# Patient Record
Sex: MALE | Race: WHITE | NOT HISPANIC OR LATINO | Employment: OTHER | URBAN - METROPOLITAN AREA
[De-identification: names, ages, dates, MRNs, and addresses within clinical notes are randomized per-mention and may not be internally consistent; named-entity substitution may affect disease eponyms.]

---

## 2023-09-08 PROBLEM — K22.70 BARRETT'S ESOPHAGUS WITHOUT DYSPLASIA: Status: ACTIVE | Noted: 2023-09-08

## 2023-09-08 PROBLEM — I82.90 VENOUS THROMBOSIS: Status: ACTIVE | Noted: 2023-09-08

## 2023-09-08 PROBLEM — M12.9 ARTHROPATHY: Status: ACTIVE | Noted: 2023-09-08

## 2023-09-08 PROBLEM — C67.9 BLADDER CANCER (MULTI): Status: ACTIVE | Noted: 2023-09-08

## 2023-09-08 PROBLEM — M06.9 CHRONIC RHEUMATIC ARTHRITIS (MULTI): Status: ACTIVE | Noted: 2023-09-08

## 2023-09-08 PROBLEM — E78.6 FAMILIAL HDL DEFICIENCY: Status: ACTIVE | Noted: 2023-09-08

## 2023-09-08 PROBLEM — H04.129 TEAR FILM INSUFFICIENCY: Status: ACTIVE | Noted: 2023-09-08

## 2023-09-08 RX ORDER — LEVOTHYROXINE SODIUM 25 UG/1
1 TABLET ORAL
COMMUNITY

## 2023-09-08 RX ORDER — OMEPRAZOLE 40 MG/1
1 CAPSULE, DELAYED RELEASE ORAL DAILY
COMMUNITY
Start: 2019-05-30

## 2023-09-08 RX ORDER — ACETAMINOPHEN 160 MG/5ML
SUSPENSION ORAL
COMMUNITY

## 2023-09-08 RX ORDER — FOLIC ACID 1 MG/1
1 TABLET ORAL DAILY
COMMUNITY

## 2023-09-08 RX ORDER — OXYCODONE HCL 5 MG/5 ML
5 SOLUTION, ORAL ORAL EVERY 4 HOURS PRN
COMMUNITY

## 2023-09-08 RX ORDER — CHOLECALCIFEROL (VITAMIN D3) 50 MCG
TABLET ORAL
COMMUNITY
Start: 2010-04-15

## 2023-09-08 RX ORDER — TRAMADOL HYDROCHLORIDE 50 MG/1
1 TABLET ORAL EVERY 8 HOURS PRN
COMMUNITY

## 2023-09-08 RX ORDER — LEFLUNOMIDE 20 MG/1
1 TABLET ORAL DAILY
COMMUNITY

## 2023-09-08 RX ORDER — TOCILIZUMAB 20 MG/ML
INJECTION, SOLUTION, CONCENTRATE INTRAVENOUS
COMMUNITY

## 2023-09-08 RX ORDER — NAPROXEN SODIUM 220 MG/1
1 TABLET, FILM COATED ORAL DAILY
COMMUNITY

## 2023-09-08 RX ORDER — HYDROGEN PEROXIDE 3 %
1 SOLUTION, NON-ORAL MISCELLANEOUS DAILY
COMMUNITY

## 2023-09-08 RX ORDER — HYDROXYCHLOROQUINE SULFATE 200 MG/1
2 TABLET, FILM COATED ORAL DAILY
COMMUNITY

## 2023-09-08 RX ORDER — NAPROXEN SODIUM 220 MG/1
1 TABLET ORAL 3 TIMES DAILY
COMMUNITY

## 2023-09-08 RX ORDER — NAPROXEN 500 MG/1
1 TABLET ORAL EVERY 12 HOURS PRN
COMMUNITY

## 2024-02-09 ENCOUNTER — APPOINTMENT (OUTPATIENT)
Dept: OPHTHALMOLOGY | Facility: CLINIC | Age: 64
End: 2024-02-09

## 2024-03-04 ENCOUNTER — OFFICE VISIT (OUTPATIENT)
Dept: OPHTHALMOLOGY | Facility: CLINIC | Age: 64
End: 2024-03-04
Payer: MEDICARE

## 2024-03-04 ENCOUNTER — CLINICAL SUPPORT (OUTPATIENT)
Dept: OPHTHALMOLOGY | Facility: CLINIC | Age: 64
End: 2024-03-04
Payer: MEDICARE

## 2024-03-04 ENCOUNTER — APPOINTMENT (OUTPATIENT)
Dept: OPHTHALMOLOGY | Facility: CLINIC | Age: 64
End: 2024-03-04
Payer: MEDICARE

## 2024-03-04 DIAGNOSIS — H04.123 INSUFFICIENCY OF TEAR FILM OF BOTH EYES: ICD-10-CM

## 2024-03-04 DIAGNOSIS — H52.7 REFRACTION ERROR: ICD-10-CM

## 2024-03-04 DIAGNOSIS — H25.813 COMBINED FORMS OF AGE-RELATED CATARACT OF BOTH EYES: ICD-10-CM

## 2024-03-04 DIAGNOSIS — Z79.899 OTHER LONG TERM (CURRENT) DRUG THERAPY: Primary | ICD-10-CM

## 2024-03-04 PROBLEM — I82.90 VENOUS THROMBOSIS: Status: RESOLVED | Noted: 2023-09-08 | Resolved: 2024-03-04

## 2024-03-04 PROCEDURE — 92015 DETERMINE REFRACTIVE STATE: CPT | Performed by: OPHTHALMOLOGY

## 2024-03-04 PROCEDURE — 1036F TOBACCO NON-USER: CPT | Performed by: OPHTHALMOLOGY

## 2024-03-04 PROCEDURE — 92083 EXTENDED VISUAL FIELD XM: CPT | Performed by: OPHTHALMOLOGY

## 2024-03-04 PROCEDURE — 99214 OFFICE O/P EST MOD 30 MIN: CPT | Performed by: OPHTHALMOLOGY

## 2024-03-04 PROCEDURE — 1160F RVW MEDS BY RX/DR IN RCRD: CPT | Performed by: OPHTHALMOLOGY

## 2024-03-04 PROCEDURE — 1159F MED LIST DOCD IN RCRD: CPT | Performed by: OPHTHALMOLOGY

## 2024-03-04 PROCEDURE — 1126F AMNT PAIN NOTED NONE PRSNT: CPT | Performed by: OPHTHALMOLOGY

## 2024-03-04 RX ORDER — CYCLOBENZAPRINE HCL 10 MG
TABLET ORAL
COMMUNITY
Start: 2023-05-16

## 2024-03-04 RX ORDER — LOSARTAN POTASSIUM 50 MG/1
1 TABLET ORAL DAILY
COMMUNITY
Start: 2023-06-16

## 2024-03-04 RX ORDER — SILDENAFIL 50 MG/1
TABLET, FILM COATED ORAL
COMMUNITY
Start: 2024-02-08

## 2024-03-04 RX ORDER — ATORVASTATIN CALCIUM 40 MG/1
1 TABLET, FILM COATED ORAL DAILY
COMMUNITY
Start: 2023-03-08

## 2024-03-04 RX ORDER — GABAPENTIN 300 MG/1
CAPSULE ORAL
COMMUNITY
Start: 2023-09-20

## 2024-03-04 RX ORDER — PANTOPRAZOLE SODIUM 40 MG/1
TABLET, DELAYED RELEASE ORAL
COMMUNITY
Start: 2023-06-08

## 2024-03-04 RX ORDER — OXYCODONE AND ACETAMINOPHEN 5; 325 MG/1; MG/1
TABLET ORAL
COMMUNITY
Start: 2023-09-11

## 2024-03-04 RX ORDER — MULTIVITAMIN
1 TABLET ORAL 2 TIMES DAILY
COMMUNITY
Start: 2012-09-20

## 2024-03-04 RX ORDER — TAMSULOSIN HYDROCHLORIDE 0.4 MG/1
CAPSULE ORAL
COMMUNITY
Start: 2021-05-13

## 2024-03-04 RX ORDER — ISOSORBIDE MONONITRATE 30 MG/1
TABLET, EXTENDED RELEASE ORAL
COMMUNITY
Start: 2023-09-13

## 2024-03-04 RX ORDER — MULTIVITAMIN
1 TABLET ORAL
COMMUNITY
Start: 2011-06-03

## 2024-03-04 RX ORDER — LIDOCAINE 50 MG/G
1 PATCH TOPICAL
COMMUNITY
Start: 2023-11-28

## 2024-03-04 ASSESSMENT — KERATOMETRY
OD_K1POWER_DIOPTERS: 45.50
OS_AXISANGLE_DEGREES: 80
OD_K2POWER_DIOPTERS: 46.00
OD_AXISANGLE2_DEGREES: 105
OS_AXISANGLE2_DEGREES: 170
OS_K1POWER_DIOPTERS: 46.50
OD_AXISANGLE_DEGREES: 15
OS_K2POWER_DIOPTERS: 46.75

## 2024-03-04 ASSESSMENT — ENCOUNTER SYMPTOMS
CARDIOVASCULAR NEGATIVE: 0
MUSCULOSKELETAL NEGATIVE: 0
OCCASIONAL FEELINGS OF UNSTEADINESS: 0
GASTROINTESTINAL NEGATIVE: 0
ENDOCRINE NEGATIVE: 0
RESPIRATORY NEGATIVE: 0
DEPRESSION: 0
ALLERGIC/IMMUNOLOGIC NEGATIVE: 0
HEMATOLOGIC/LYMPHATIC NEGATIVE: 0
PSYCHIATRIC NEGATIVE: 0
LOSS OF SENSATION IN FEET: 0
EYES NEGATIVE: 0
CONSTITUTIONAL NEGATIVE: 0
NEUROLOGICAL NEGATIVE: 0

## 2024-03-04 ASSESSMENT — EXTERNAL EXAM - LEFT EYE: OS_EXAM: 1+ BROW PTOSIS

## 2024-03-04 ASSESSMENT — PAIN SCALES - GENERAL: PAINLEVEL: 0-NO PAIN

## 2024-03-04 ASSESSMENT — EXTERNAL EXAM - RIGHT EYE: OD_EXAM: 1+ BROW PTOSIS

## 2024-03-04 ASSESSMENT — REFRACTION_WEARINGRX
OS_SPHERE: -4.25
OS_AXIS: 002
OD_ADD: +3.00
OD_CYLINDER: -0.75
OD_AXIS: 065
OS_CYLINDER: -0.75
OS_ADD: +3.00
SPECS_TYPE: PROGRESSIVE
OD_SPHERE: -3.00

## 2024-03-04 ASSESSMENT — PATIENT HEALTH QUESTIONNAIRE - PHQ9
1. LITTLE INTEREST OR PLEASURE IN DOING THINGS: NOT AT ALL
SUM OF ALL RESPONSES TO PHQ9 QUESTIONS 1 AND 2: 0
2. FEELING DOWN, DEPRESSED OR HOPELESS: NOT AT ALL

## 2024-03-04 ASSESSMENT — TONOMETRY
IOP_METHOD: GOLDMANN APPLANATION
OS_IOP_MMHG: 18
OD_IOP_MMHG: 18

## 2024-03-04 ASSESSMENT — CUP TO DISC RATIO
OS_RATIO: 0.35
OD_RATIO: 0.35

## 2024-03-04 ASSESSMENT — VISUAL ACUITY
OS_CC: 20/20
OS_CC+: -1
OD_CC+: -2
OD_CC: 20/20
METHOD: SNELLEN - LINEAR

## 2024-03-04 ASSESSMENT — SLIT LAMP EXAM - LIDS
COMMENTS: 1+ BLEPHARITIS, 2+ DERMATOCHALASIS - UPPER LID
COMMENTS: 1+ BLEPHARITIS, 2+ DERMATOCHALASIS - UPPER LID

## 2024-03-04 NOTE — PROGRESS NOTES
Subjective   Patient ID: Vickey Daniels is a 64 y.o. male.    Chief Complaint    Annual Exam       HPI    Complete and Harrington visual field (HVF) plaq exam.  No new changes in health history or meds.  Vision is good and stable without new complaints or problems.    Last edited by Santi Berry MD on 3/4/2024  2:59 PM.        Current Outpatient Medications (Ophthalmology pharm classes)   Medication Sig Dispense Refill    lubricating eye drops ophthalmic solution 1 drop if needed for dry eyes.       Current Outpatient Medications (Other)   Medication Sig Dispense Refill    acetaminophen 160 mg/5 mL (5 mL) suspension as directed Orally      aspirin 81 mg chewable tablet Chew 1 tablet (81 mg) once daily.      atorvastatin (Lipitor) 40 mg tablet Take 1 tablet (40 mg) by mouth once daily.      calcium carbonate-vitamin D3 600 mg-10 mcg (400 unit) tablet Take 1 tablet by mouth twice a day.      cholecalciferol (Vitamin D-3) 50 MCG (2000 UT) tablet as directed Orally Daily with food      hydroxychloroquine (PlaqueniL) 200 mg tablet Take 2 tablets (400 mg) by mouth once daily. Take with food or milk      leflunomide (Arava) 20 mg tablet Take 1 tablet (20 mg) by mouth once daily.      levothyroxine (Synthroid) 25 mcg tablet Take 1 tablet (25 mcg) by mouth once daily in the morning. Take before meals.      lidocaine (Lidoderm) 5 % patch Place 1 patch on the skin once daily.      losartan (Cozaar) 50 mg tablet Take 1 tablet (50 mg) by mouth once daily.      multivitamin tablet Take 1 tablet by mouth once daily.      omega 3-dha-epa-fish oil (Fish OiL) 1,200 (144-216) mg capsule Take 1 capsule (1,200 mg) by mouth 3 times a day.      pantoprazole (ProtoNix) 40 mg EC tablet Take by mouth once daily.      sildenafil (Viagra) 50 mg tablet Take by mouth.      tamsulosin (Flomax) 0.4 mg 24 hr capsule       upadacitinib ER (Rinvoq) 15 mg tablet extended release 24 hr TAKE 1 TABLET ONCE DAILY. SWALLOW WHOLE, DO NOT CRUSH, CHEW, OR OPEN       abatacept (Orencia, with maltose,) 250 mg injection as directed Intravenous      cyclobenzaprine (Flexeril) 10 mg tablet take 1/2 to 1 tablet by mouth up to twice daily as needed for neck pain      esomeprazole (NexIUM) 20 mg DR capsule Take 1 capsule (20 mg) by mouth once daily.      folic acid (Folvite) 1 mg tablet Take 1 tablet (1 mg) by mouth once daily.      gabapentin (Neurontin) 300 mg capsule       iron/calcium/vitamin D2 (CALCIUM 600 IRON/D ORAL) Take 1 tablet by mouth 2 times a day.      isosorbide mononitrate ER (Imdur) 30 mg 24 hr tablet       multivitamin,therapeutic (THERAPEUTIC MULTIVITAMIN ORAL) Take 1 capsule by mouth once daily.      naproxen (Naprosyn) 500 mg tablet Take 1 tablet (500 mg) by mouth every 12 hours if needed.      omeprazole (PriLOSEC) 40 mg DR capsule Take 1 capsule (40 mg) by mouth once daily.      oxyCODONE (Roxicodone) 5 mg/5 mL solution Take 5 mL (5 mg) by mouth every 4 hours if needed for moderate pain (4 - 6) or severe pain (7 - 10).      oxyCODONE-acetaminophen (Percocet) 5-325 mg tablet Take 1 tablet by mouth every 6 hours as needed for pain for up to 3 days.      RANITIDINE HCL ORAL Take 2 capsules by mouth once daily at bedtime.      tocilizumab (Actemra) 400 mg/20 mL (20 mg/mL) solution Intravenous      traMADol (Ultram) 50 mg tablet Take 1 tablet (50 mg) by mouth every 8 hours if needed.         Objective   Base Eye Exam       Visual Acuity (Snellen - Linear)         Right Left Both    Dist cc 20/20 -2 20/20 -1     Near cc   J1+              Tonometry (Goldmann Applanation, 2:24 PM)         Right Left    Pressure 18 18   Holding lids               Pupils         Dark Shape React APD    Right 4 Round 2 None    Left 4 Round 2 None              Dilation       Both eyes: 1% Tropic 2.5% Phen @ 2:24 PM                  Additional Tests       Color         Right Left    Ishihara 15/15 15/15              Keratometry         K1 Axis K2 Axis    Right 45.50 105 46.00 15     Left 46.50 170 46.75 80                  Slit Lamp and Fundus Exam       External Exam         Right Left    External 1+ Brow ptosis 1+ Brow ptosis              Slit Lamp Exam         Right Left    Lids/Lashes 1+ Blepharitis, 2+ Dermatochalasis - upper lid 1+ Blepharitis, 2+ Dermatochalasis - upper lid    Conjunctiva/Sclera normal bulbar and palepbral conjunctiva normal bulbar and palepbral conjunctiva    Cornea normal epi/stroma/endo and tear film normal epi/stroma/endo and tear film    Anterior Chamber normal anterior chamber, deep and quiet normal anterior chamber, deep and quiet    Iris pupil miotic; flomax pupil miotic; flomax    Lens Trace Nuclear sclerosis, Trace Cortical cataract Trace Nuclear sclerosis, Trace Cortical cataract    Anterior Vitreous vitreous syneresis vitreous syneresis              Fundus Exam         Right Left    Disc optic nerve is pink with good rim optic nerve is pink with good rim    C/D Ratio 0.35 0.35    Macula normal macula normal macula    Vessels normal retinal vessels normal retinal vessels    Periphery peripheral retinal degeneration peripheral retinal degeneration                  Refraction       Wearing Rx         Sphere Cylinder Axis Add    Right -3.00 -0.75 065 +3.00    Left -4.25 -0.75 002 +3.00      Type: progressive              Final Rx         Sphere Cylinder [place] Dist VA Add Near VA    Right -3.00 -1.00 080 20/20 +3.00 20/20    Left -4.00 -0.50 010 20/20 +3.00       Type: progressive    Expiration Date: 3/4/2026    Pupillary Distance: 69                    Assessment/Plan   Problem List Items Addressed This Visit          Eye/Vision problems    Tear film insufficiency    Combined forms of age-related cataract of both eyes    Refraction error     Rx unchanged.             Other    Other long term (current) drug therapy - Primary     F/u one year full with hvf plaq.          Relevant Orders    Harrington Visual Field - OU - Both Eyes (Completed)

## 2024-03-04 NOTE — LETTER
March 4, 2024    Luisito Carmona MD  [address] [id number]     Patient: Vickey Daniels   YOB: 1960   Date of Visit: 3/4/2024       Dear Dr. Luisito Carmona MD:    Thank you for referring Vickey Daniels to me for evaluation. Here is my assessment and plan of care:    Assessment/Plan:  Vickey was seen today for annual exam.  Diagnoses and all orders for this visit:  Other long term (current) drug therapy (Primary)  -     Harrington Visual Field - OU - Both Eyes  Combined forms of age-related cataract of both eyes  Insufficiency of tear film of both eyes  Refraction error    Below you will find my full exam findings. If you have questions, please do not hesitate to call me. I look forward to following Vickey along with you.     Normal Harrington visual field (HVF) plaquenil.  Follow up in one year.     Sincerely,        Santi Berry MD        CC:   Mali New MD        Base Eye Exam       Visual Acuity (Snellen - Linear)         Right Left Both    Dist cc 20/20 -2 20/20 -1     Near cc   J1+              Tonometry (Goldmann Applanation, 2:24 PM)         Right Left    Pressure 18 18   Holding lids               Pupils         Dark Shape React APD    Right 4 Round 2 None    Left 4 Round 2 None              Dilation       Both eyes: 1% Tropic 2.5% Phen @ 2:24 PM                  Additional Tests       Color         Right Left    Ishihara 15/15 15/15              Keratometry         K1 Axis K2 Axis    Right 45.50 105 46.00 15    Left 46.50 170 46.75 80                  Slit Lamp and Fundus Exam       External Exam         Right Left    External 1+ Brow ptosis 1+ Brow ptosis              Slit Lamp Exam         Right Left    Lids/Lashes 1+ Blepharitis, 2+ Dermatochalasis - upper lid 1+ Blepharitis, 2+ Dermatochalasis - upper lid    Conjunctiva/Sclera normal bulbar and palepbral conjunctiva normal bulbar and palepbral conjunctiva    Cornea normal epi/stroma/endo and  tear film normal epi/stroma/endo and tear film    Anterior Chamber normal anterior chamber, deep and quiet normal anterior chamber, deep and quiet    Iris pupil miotic; flomax pupil miotic; flomax    Lens Trace Nuclear sclerosis, Trace Cortical cataract Trace Nuclear sclerosis, Trace Cortical cataract    Anterior Vitreous vitreous syneresis vitreous syneresis              Fundus Exam         Right Left    Disc optic nerve is pink with good rim optic nerve is pink with good rim    C/D Ratio 0.35 0.35    Macula normal macula normal macula    Vessels normal retinal vessels normal retinal vessels    Periphery peripheral retinal degeneration peripheral retinal degeneration                  Refraction       Wearing Rx         Sphere Cylinder Axis Add    Right -3.00 -0.75 065 +3.00    Left -4.25 -0.75 002 +3.00      Type: progressive              Final Rx         Sphere Cylinder [place] Dist VA Add Near VA    Right -3.00 -1.00 080 20/20 +3.00 20/20    Left -4.00 -0.50 010 20/20 +3.00       Type: progressive    Expiration Date: 3/4/2026    Pupillary Distance: 69

## 2024-10-01 ENCOUNTER — APPOINTMENT (OUTPATIENT)
Dept: UROLOGY | Facility: CLINIC | Age: 64
End: 2024-10-01
Payer: MEDICARE

## 2025-03-10 ENCOUNTER — APPOINTMENT (OUTPATIENT)
Dept: OPHTHALMOLOGY | Facility: CLINIC | Age: 65
End: 2025-03-10
Payer: MEDICARE

## 2025-09-15 ENCOUNTER — APPOINTMENT (OUTPATIENT)
Dept: UROLOGY | Facility: CLINIC | Age: 65
End: 2025-09-15
Payer: MEDICARE